# Patient Record
Sex: FEMALE | Race: ASIAN
[De-identification: names, ages, dates, MRNs, and addresses within clinical notes are randomized per-mention and may not be internally consistent; named-entity substitution may affect disease eponyms.]

---

## 2020-10-07 ENCOUNTER — HOSPITAL ENCOUNTER (INPATIENT)
Dept: HOSPITAL 77 - KA.MS | Age: 55
LOS: 1 days | Discharge: HOME | DRG: 813 | End: 2020-10-08
Attending: FAMILY MEDICINE | Admitting: FAMILY MEDICINE
Payer: MEDICAID

## 2020-10-07 DIAGNOSIS — H91.93: ICD-10-CM

## 2020-10-07 DIAGNOSIS — D69.3: Primary | ICD-10-CM

## 2020-10-07 DIAGNOSIS — Z79.899: ICD-10-CM

## 2020-10-07 DIAGNOSIS — M32.9: ICD-10-CM

## 2020-10-07 DIAGNOSIS — I50.20: ICD-10-CM

## 2020-10-07 DIAGNOSIS — Z79.52: ICD-10-CM

## 2020-10-07 DIAGNOSIS — I42.0: ICD-10-CM

## 2020-10-07 DIAGNOSIS — Z20.828: ICD-10-CM

## 2020-10-07 DIAGNOSIS — D70.9: ICD-10-CM

## 2020-10-07 PROCEDURE — U0002 COVID-19 LAB TEST NON-CDC: HCPCS

## 2020-10-07 RX ADMIN — Medication SCH MG: at 21:20

## 2020-10-07 RX ADMIN — Medication SCH TAB: at 21:20

## 2020-10-08 RX ADMIN — Medication SCH MG: at 08:22

## 2020-10-08 RX ADMIN — Medication SCH TAB: at 08:21

## 2020-10-08 NOTE — [id number]
**Discharge Summary





- Hospital Course


Diagnosis: Stroke: No





- Discharge Data


Discharge Date: 10/08/20


Discharge Disposition: Home, Self-Care 01


Condition: Good





- Referral to Home Health


Primary Care Physician: 


Angie Soriano MD








- Patient Instructions


Diet: Usual Diet as Tolerated


Activity: As Tolerated


Driving: [place] Drive Today


Showering/Bathing: May Shower


Notify Provider of: Fever


Other/Special Instructions: Worsening bleeding, bruising on your skin, blood in 

urine or bleeding gums.  Continue with weekly CBC blood work at [facility]





- Discharge Plan


*PRESCRIPTION DRUG MONITORING PROGRAM REVIEWED*: Not Applicable


*COPY OF PRESCRIPTION DRUG MONITORING REPORT IN PATIENT NICHOLAS: Not Applicable


Home Medications: 


                                    Home Meds





Cholecalciferol (Vitamin D3) [Vitamin D3] 2,000 unit PO DAILY 07/08/20 [History]


Furosemide 40 mg PO DAILY 07/08/20 [History]


Gabapentin [Neurontin] 100 mg PO TID 07/08/20 [History]


Hydroxychloroquine [Plaquenil] 200 mg PO DAILY 07/08/20 [History]


Nortriptyline 25 mg PO BEDTIME 07/08/20 [History]


Pilocarpine [Salagen] 5 mg PO BID 07/08/20 [History]


Sacubitril/Valsartan [Entresto 97 mg-103 mg Tablet] 1 tab PO BID 07/08/20 

[History]


carvediloL [Carvedilol] 25 mg PO BIDMEALS 07/08/20 [History]


predniSONE [Prednisone] 7.5 mg PO DAILY 07/08/20 [History]


Eltrombopag Olamine [Promacta] 25 mg PO BID 10/07/20 [History]


Furosemide [Lasix] 20 mg PO 1200 10/07/20 [History]


Pantoprazole Sodium [Protonix] 20 mg PO DAILY 10/07/20 [History]











- Discharge Summary/Plan Comment


DC Time >30 min.: No


Discharge Summary/Plan Comment: 


Final diagnosis


Thrombocytopenia, severe improved





History summary


54-year-old female was admitted yesterday by Dr. DOWNS in conjunction with Dr. Francois oncologist for IVIG therapy and life-threatening symptomatic  

thrombocytopenia of a platelet count of 7000.  Patient has past medical history 

of thrombocytopenia, neutropenia, systemic lupus erythematosus, systolic 

congestive heart failure and dilated cardiomyopathy and neutropenia.  Seen and 

evaluated in an outpatient [facility] yesterday by oncologist and had 

significant subcutaneous bruising throughout her body.





Hospital course


Eventful as she did have IVIG and platelet count improved to 18,000 with less 

bruising.  Vital signs stable, no fever.  She did receive Granix the morning of 

discharge.  





Consultations


Heart/oncologist was consulted of the patient's hospital progress, lab values 

and agrees for discharge with close follow-up of weekly CBC counts.  Patient is 

to monitor for ongoing bruising worsening bruising hematuria hemoptysis bleeding

 gums.





- General Info


Functional Status: Reports: Pain Controlled





- Review of Systems


General: Reports: No Symptoms


HEENT: Reports: No Symptoms


Pulmonary: Reports: No Symptoms


Cardiovascular: Reports: No Symptoms


Gastrointestinal: Reports: No Symptoms


Genitourinary: Reports: No Symptoms


Musculoskeletal: Reports: No Symptoms


Skin: Reports: Bruising (improving bruising)


Neurological: Denies: Confusion


Psychiatric: Reports: No Symptoms





- Patient Data


Vitals - Most Recent: 


                                Last Vital Signs











Temp  97.2 F   10/08/20 06:09


 


Pulse  78   10/08/20 08:21


 


Resp  16   10/08/20 06:09


 


BP  174/88 H  10/08/20 08:21


 


Pulse Ox  97   10/08/20 06:09











Weight - Most Recent: 128 lb 4.8 oz


I&O - Last 24 hours: 


                                 Intake & Output











 10/07/20 10/08/20 10/08/20





 22:59 06:59 14:59


 


Intake Total  350 


 


Balance  350 











Lab Results - Last 24 hrs: 


                         Laboratory Results - last 24 hr











  10/07/20 10/08/20 Range/Units





  17:10 07:15 


 


WBC   2.94 L  (5.00-10.00)  10^3/uL


 


RBC   4.36  (3.80-5.50)  10^6/uL


 


Hgb   13.0  (12.0-16.0)  g/dL


 


Hct   39.1  (37.0-47.0)  %


 


MCV   89.7  (82.0-92.0)  fL


 


MCH   29.8  (27.0-31.0)  pg


 


MCHC   33.2  (32.0-36.0)  g/dL


 


RDW   11.7  (11.5-14.5)  %


 


Plt Count   18 L*  (150-400)  10^3/uL


 


MPV   13.3 H  (7.4-10.4)  fL


 


Immature Gran % (Auto)   0.0  (0.0-5.0)  %


 


Neut % (Auto)   33.0 L  (50.0-70.0)  %


 


Lymph % (Auto)   46.6 H  (20.0-40.0)  %


 


Mono % (Auto)   16.0 H  (2.0-8.0)  %


 


Eos % (Auto)   3.4 H  (1.0-3.0)  %


 


Baso % (Auto)   1.0  (0.0-1.0)  %


 


Neut # (Auto)   0.97 L  (2.50-7.00)  10^3/uL


 


Lymph # (Auto)   1.37  (1.00-4.00)  10^3/uL


 


Mono # (Auto)   0.47  (0.10-0.80)  10^3/uL


 


Eos # (Auto)   0.10  (0.10-0.30)  10^3/uL


 


Baso # (Auto)   0.03  (0.00-0.10)  10^3/uL


 


Immature Gran # (Auto)   0.00  (0.00-0.50)  10^3/uL


 


SARS CoV-2 RNA Rapid J LUIS  Negative   (NEGATIVE)  











Med Orders - Current: 


                               Current Medications





Carvedilol (Coreg)  25 mg PO BIDMEALS [facility]


   Last Admin: 10/08/20 08:21 Dose:  25 mg


   Documented by: 


Cholecalciferol (Vitamin D3)  50 mcg PO DAILY [facility]


   Last Admin: 10/08/20 08:21 Dose:  50 mcg


   Documented by: 


Sodium Chloride (Normal Saline)  500 mls @ 100 mls/hr IV .BOLUS [facility]


   Last Admin: 10/07/20 18:10 Dose:  100 mls/hr


   Documented by: 


Entresto 97 Mg - 103 (Mg Tab **Own Med**)  0 tab PO BID [facility]


   Last Admin: 10/08/20 08:21 Dose:  1 tab


   Documented by: 


Pilocarpine 5 Mg (Tabs **Own Med**)  0 mg PO BID [facility]


   Last Admin: 10/08/20 08:22 Dose:  5 mg


   Documented by: 


Omeprazole (Omeprazole)  20 mg PO ACBREAKFAST [facility]


   Last Admin: 10/08/20 07:19 Dose:  20 mg


   Documented by: 


Prednisone (Prednisone)  7.5 mg PO DAILY [facility]


   Last Admin: 10/08/20 08:20 Dose:  7.5 mg


   Documented by: 





Discontinued Medications





Acetaminophen (Tylenol)  650 mg PO ONETIME ONE


   Stop: 10/07/20 17:16


   Last Admin: 10/07/20 18:00 Dose:  650 mg


   Documented by: 


Diphenhydramine HCl (Benadryl)  25 mg IVPUSH ONETIME ONE


   Stop: 10/07/20 17:16


   Last Admin: 10/07/20 18:10 Dose:  25 mg


   Documented by: 


Immune Globulin (Octagam 10%)  600 mls @ 18 mls/hr IV ONETIME ONE; Protocol


   Stop: 10/09/20 02:49


   Last Admin: 10/07/20 18:32 Dose:  18 mls/hr


   Documented by: 


Non-Formulary Medication (Sacubitril/Valsartan [Entresto 97 Mg-103 Mg Tablet])  

1 tab PO BID [facility]











- Exam


Quality Assessment: Denies: Supplemental Oxygen


General: Reports: Alert, Oriented


Lungs: Reports: Clear to Auscultation, Normal Respiratory Effort


Cardiovascular: Reports: Regular Rhythm


GI/Abdominal Exam: Soft, No Distention


Skin: Reports: Ecchymosis (bruising upper cutaneous arms and legs however 

lighting up in color and improving)


Psy/Mental Status: Reports: Alert, Normal Affect, Normal Mood

## 2022-10-06 ENCOUNTER — HOSPITAL ENCOUNTER (OUTPATIENT)
Dept: HOSPITAL 77 - KA.ED | Age: 57
Setting detail: OBSERVATION
LOS: 2 days | Discharge: HOME | End: 2022-10-08
Attending: STUDENT IN AN ORGANIZED HEALTH CARE EDUCATION/TRAINING PROGRAM | Admitting: NURSE PRACTITIONER
Payer: MEDICARE

## 2022-10-06 DIAGNOSIS — E86.0: Primary | ICD-10-CM

## 2022-10-06 DIAGNOSIS — E09.9: ICD-10-CM

## 2022-10-06 DIAGNOSIS — Z79.84: ICD-10-CM

## 2022-10-06 DIAGNOSIS — I42.9: ICD-10-CM

## 2022-10-06 DIAGNOSIS — D70.8: ICD-10-CM

## 2022-10-06 DIAGNOSIS — I50.9: ICD-10-CM

## 2022-10-06 DIAGNOSIS — B02.29: ICD-10-CM

## 2022-10-06 DIAGNOSIS — Z79.899: ICD-10-CM

## 2022-10-06 DIAGNOSIS — K21.9: ICD-10-CM

## 2022-10-06 DIAGNOSIS — R33.9: ICD-10-CM

## 2022-10-06 DIAGNOSIS — M32.9: ICD-10-CM

## 2022-10-06 DIAGNOSIS — E87.6: ICD-10-CM

## 2022-10-06 DIAGNOSIS — E88.09: ICD-10-CM

## 2022-10-06 DIAGNOSIS — E83.42: ICD-10-CM

## 2022-10-06 PROCEDURE — 83690 ASSAY OF LIPASE: CPT

## 2022-10-06 PROCEDURE — 85025 COMPLETE CBC W/AUTO DIFF WBC: CPT

## 2022-10-06 PROCEDURE — 85379 FIBRIN DEGRADATION QUANT: CPT

## 2022-10-06 PROCEDURE — 80053 COMPREHEN METABOLIC PANEL: CPT

## 2022-10-06 PROCEDURE — 36415 COLL VENOUS BLD VENIPUNCTURE: CPT

## 2022-10-06 PROCEDURE — 87040 BLOOD CULTURE FOR BACTERIA: CPT

## 2022-10-06 PROCEDURE — 87086 URINE CULTURE/COLONY COUNT: CPT

## 2022-10-06 PROCEDURE — U0002 COVID-19 LAB TEST NON-CDC: HCPCS

## 2022-10-06 PROCEDURE — 80202 ASSAY OF VANCOMYCIN: CPT

## 2022-10-06 PROCEDURE — 93005 ELECTROCARDIOGRAM TRACING: CPT

## 2022-10-06 PROCEDURE — 83735 ASSAY OF MAGNESIUM: CPT

## 2022-10-06 PROCEDURE — 71045 X-RAY EXAM CHEST 1 VIEW: CPT

## 2022-10-06 PROCEDURE — 83605 ASSAY OF LACTIC ACID: CPT

## 2022-10-06 PROCEDURE — 86140 C-REACTIVE PROTEIN: CPT

## 2022-10-06 PROCEDURE — 84100 ASSAY OF PHOSPHORUS: CPT

## 2022-10-06 PROCEDURE — 84484 ASSAY OF TROPONIN QUANT: CPT

## 2022-10-28 LAB
ANION GAP SERPL CALC-SCNC: 11.5 MMOL/L (ref 5–15)
ANION GAP SERPL CALC-SCNC: 12.3 MMOL/L (ref 5–15)
CHLORIDE SERPL-SCNC: 106 MMOL/L (ref 98–115)
CHLORIDE SERPL-SCNC: 106 MMOL/L (ref 98–115)
EGFRCR SERPLBLD CKD-EPI 2021: 68 ML/MIN (ref 60–?)
EGFRCR SERPLBLD CKD-EPI 2021: 79 ML/MIN (ref 60–?)
SODIUM SERPL-SCNC: 138 MMOL/L (ref 136–145)
SODIUM SERPL-SCNC: 140 MMOL/L (ref 136–145)

## 2022-11-09 ENCOUNTER — HOSPITAL ENCOUNTER (OUTPATIENT)
Dept: HOSPITAL 77 - KA.ED | Age: 57
Setting detail: OBSERVATION
LOS: 1 days | Discharge: HOME | End: 2022-11-10
Attending: FAMILY MEDICINE | Admitting: FAMILY MEDICINE
Payer: MEDICARE

## 2022-11-09 DIAGNOSIS — R53.1: Primary | ICD-10-CM

## 2022-11-09 DIAGNOSIS — Z79.84: ICD-10-CM

## 2022-11-09 DIAGNOSIS — D70.9: ICD-10-CM

## 2022-11-09 DIAGNOSIS — Z98.890: ICD-10-CM

## 2022-11-09 DIAGNOSIS — G47.30: ICD-10-CM

## 2022-11-09 DIAGNOSIS — Z79.899: ICD-10-CM

## 2022-11-09 DIAGNOSIS — D84.9: ICD-10-CM

## 2022-11-09 DIAGNOSIS — M35.00: ICD-10-CM

## 2022-11-09 DIAGNOSIS — E11.9: ICD-10-CM

## 2022-11-09 DIAGNOSIS — K21.9: ICD-10-CM

## 2022-11-09 DIAGNOSIS — E87.6: ICD-10-CM

## 2022-11-09 DIAGNOSIS — I50.20: ICD-10-CM

## 2022-11-09 DIAGNOSIS — I42.0: ICD-10-CM

## 2022-11-09 LAB
ANION GAP SERPL CALC-SCNC: 12.8 MMOL/L (ref 5–15)
CHLORIDE SERPL-SCNC: 105 MMOL/L (ref 98–107)
EGFRCR SERPLBLD CKD-EPI 2021: 81 ML/MIN (ref 60–?)
SODIUM SERPL-SCNC: 140 MMOL/L (ref 136–145)

## 2022-11-09 RX ADMIN — CARBOXYMETHYLCELLULOSE SODIUM SCH DROP: 5 SOLUTION/ DROPS OPHTHALMIC at 20:22

## 2022-11-09 RX ADMIN — CARVEDILOL SCH EACH: 12.5 TABLET, FILM COATED ORAL at 20:22

## 2022-11-10 VITALS — DIASTOLIC BLOOD PRESSURE: 62 MMHG | HEART RATE: 94 BPM | SYSTOLIC BLOOD PRESSURE: 126 MMHG

## 2022-11-10 LAB
ANION GAP SERPL CALC-SCNC: 11.8 MMOL/L (ref 5–15)
CHLORIDE SERPL-SCNC: 106 MMOL/L (ref 98–107)
EGFRCR SERPLBLD CKD-EPI 2021: 81 ML/MIN (ref 60–?)
SODIUM SERPL-SCNC: 143 MMOL/L (ref 136–145)

## 2022-11-10 RX ADMIN — PANTOPRAZOLE SODIUM SCH: 20 TABLET, DELAYED RELEASE ORAL at 06:29

## 2022-11-10 RX ADMIN — CARVEDILOL SCH EACH: 12.5 TABLET, FILM COATED ORAL at 08:30

## 2022-11-10 RX ADMIN — CARBOXYMETHYLCELLULOSE SODIUM SCH DROP: 5 SOLUTION/ DROPS OPHTHALMIC at 08:30

## 2022-11-10 RX ADMIN — PANTOPRAZOLE SODIUM SCH MG: 20 TABLET, DELAYED RELEASE ORAL at 06:01

## 2023-10-03 ENCOUNTER — HOSPITAL ENCOUNTER (OUTPATIENT)
Dept: HOSPITAL 77 - KA.SDS | Age: 58
Discharge: HOME | End: 2023-10-03
Attending: FAMILY MEDICINE
Payer: MEDICARE

## 2023-10-03 DIAGNOSIS — D69.3: ICD-10-CM

## 2023-10-03 DIAGNOSIS — I42.0: ICD-10-CM

## 2023-10-03 DIAGNOSIS — E67.3: ICD-10-CM

## 2023-10-03 DIAGNOSIS — K31.89: ICD-10-CM

## 2023-10-03 DIAGNOSIS — R33.9: ICD-10-CM

## 2023-10-03 DIAGNOSIS — M35.00: ICD-10-CM

## 2023-10-03 DIAGNOSIS — I50.22: ICD-10-CM

## 2023-10-03 DIAGNOSIS — E66.9: ICD-10-CM

## 2023-10-03 DIAGNOSIS — Z79.899: ICD-10-CM

## 2023-10-03 DIAGNOSIS — H91.93: ICD-10-CM

## 2023-10-03 DIAGNOSIS — K76.0: ICD-10-CM

## 2023-10-03 DIAGNOSIS — K21.00: Primary | ICD-10-CM

## 2023-10-03 DIAGNOSIS — E09.65: ICD-10-CM

## 2023-10-03 DIAGNOSIS — M81.8: ICD-10-CM

## 2023-10-03 DIAGNOSIS — M32.9: ICD-10-CM

## 2023-10-03 DIAGNOSIS — K29.50: ICD-10-CM

## 2023-10-03 DIAGNOSIS — D70.9: ICD-10-CM

## 2024-08-12 ENCOUNTER — HOSPITAL ENCOUNTER (EMERGENCY)
Dept: HOSPITAL 77 - KA.ED | Age: 59
LOS: 1 days | Discharge: NURSING, REHAB OR LONG-TERM CARE | End: 2024-08-13
Payer: MEDICARE

## 2024-08-12 DIAGNOSIS — Z79.84: ICD-10-CM

## 2024-08-12 DIAGNOSIS — Z79.899: ICD-10-CM

## 2024-08-12 DIAGNOSIS — J96.90: ICD-10-CM

## 2024-08-12 DIAGNOSIS — Z90.49: ICD-10-CM

## 2024-08-12 DIAGNOSIS — Z79.52: ICD-10-CM

## 2024-08-12 DIAGNOSIS — I50.22: Primary | ICD-10-CM

## 2024-08-12 DIAGNOSIS — E10.9: ICD-10-CM

## 2024-08-12 LAB
ALBUMIN SERPL-MCNC: 3.51 G/DL (ref 3.4–5)
ALP SERPL-CCNC: 83 U/L (ref 46–116)
ALT SERPL-CCNC: 35 U/L (ref 14–63)
ANION GAP SERPL CALC-SCNC: 11.3 MMOL/L (ref 5–15)
APPEARANCE UR: CLEAR
APTT PPP: 23.8 SEC (ref 23.3–34.9)
AST SERPL-CCNC: 27 U/L (ref 15–37)
BACTERIA URNS QL MICRO: (no result) /HPF
BASOPHILS # BLD AUTO: 0.01 10^3/UL (ref 0–0.1)
BASOPHILS NFR BLD AUTO: 0.1 % (ref 0–1)
BILIRUB SERPL-MCNC: 0.3 MG/DL (ref 0.2–1)
BILIRUB UR STRIP-MCNC: NEGATIVE MG/DL
BNP SERPL-MCNC: 566 PG/ML (ref 0–100)
BUN SERPL-MCNC: 10 MG/DL (ref 7–18)
CALCIUM SERPL-MCNC: 9 MG/DL (ref 8.7–10.3)
CHLORIDE SERPL-SCNC: 105 MMOL/L (ref 98–107)
CO2 SERPL-SCNC: 29 MMOL/L (ref 21–32)
COLOR UR: YELLOW
CREAT CL 24H UR+SERPL-VRATE: (no result) ML/MIN
CREAT SERPL-MCNC: 0.86 MG/DL (ref 0.51–1.17)
EGFRCR SERPLBLD CKD-EPI 2021: 77 ML/MIN (ref 60–?)
EOSINOPHIL # BLD AUTO: 0.04 10^3/UL (ref 0.1–0.3)
EOSINOPHIL NFR BLD AUTO: 0.5 % (ref 1–3)
EPI CELLS #/AREA URNS HPF: (no result) /LPF
ERYTHROCYTE [DISTWIDTH] IN BLOOD BY AUTOMATED COUNT: 12.8 % (ref 11.5–14.5)
FLUAV RNA UPPER RESP QL NAA+PROBE: NEGATIVE
FLUBV RNA UPPER RESP QL NAA+PROBE: NEGATIVE
GLUCOSE SERPL-MCNC: 168 MG/DL (ref 70–140)
GLUCOSE UR STRIP-MCNC: NEGATIVE MG/DL
HCT VFR BLD AUTO: 40.4 % (ref 37–47)
HGB BLD-MCNC: 12.8 G/DL (ref 12–16)
IMM GRANULOCYTES # BLD: 0.01 10^3/UL (ref 0–0.5)
IMM GRANULOCYTES NFR BLD: 0.1 % (ref 0–5)
INR PPP: 1 (ref 0.9–1.1)
KETONES UR STRIP-MCNC: NEGATIVE MG/DL
LYMPHOCYTES # BLD AUTO: 1.82 10^3/UL (ref 1–4)
LYMPHOCYTES NFR BLD AUTO: 23 % (ref 20–40)
MCH RBC QN AUTO: 29 PG (ref 27–31)
MCHC RBC AUTO-ENTMCNC: 31.7 G/DL (ref 32–36)
MCHC RBC AUTO-ENTMCNC: 91.4 FL (ref 82–92)
MONOCYTES # BLD AUTO: 0.63 10^3/UL (ref 0.1–0.8)
MONOCYTES NFR BLD AUTO: 8 % (ref 2–8)
MUCOUS THREADS URNS QL MICRO: (no result) /LPF
NEUTROPHILS # BLD AUTO: 5.41 10^3/UL (ref 2.5–7)
NEUTROPHILS NFR BLD AUTO: 68.3 % (ref 50–70)
NITRITE UR QL: NEGATIVE
PH UR STRIP: 6 [PH] (ref 5–9)
PLATELET # BLD AUTO: 277 10^3/UL (ref 150–400)
PMV BLD AUTO: 11 FL (ref 7.4–10.4)
POTASSIUM SERPL-SCNC: 4.3 MMOL/L (ref 3.5–5.1)
PROT SERPL-MCNC: 8.3 G/DL (ref 6.4–8.2)
PROT UR STRIP-MCNC: (no result) MG/DL
PROTHROMBIN TIME: 10.4 SEC (ref 9.3–12.2)
RBC # BLD AUTO: 4.42 10^6/UL (ref 3.8–5.5)
RBC # URNS HPF: (no result) /HPF (ref 0–5)
RBC UR QL: NEGATIVE
RSV RNA UPPER RESP QL NAA+PROBE: NEGATIVE
SARS-COV-2 RNA RESP QL NAA+PROBE: NEGATIVE
SODIUM SERPL-SCNC: 141 MMOL/L (ref 136–145)
SP GR UR STRIP: <= 1.005 (ref 1–1.03)
UROBILINOGEN UR STRIP-ACNC: 0.2 E.U./DL (ref 0.2–1)
WBC # BLD AUTO: 7.92 10^3/UL (ref 5–10)
WBC UR QL: (no result) /HPF (ref 0–5)

## 2024-08-12 RX ADMIN — PROPOFOL ONE: 10 INJECTION, EMULSION INTRAVENOUS at 23:33

## 2024-08-12 RX ADMIN — Medication PRN ML: at 21:42

## 2024-08-12 RX ADMIN — PROPOFOL ONE MG: 10 INJECTION, EMULSION INTRAVENOUS at 23:31

## 2024-08-12 RX ADMIN — MIDAZOLAM HYDROCHLORIDE ONE MG: 1 INJECTION, SOLUTION INTRAMUSCULAR; INTRAVENOUS at 22:22

## 2024-08-12 RX ADMIN — ALBUTEROL SULFATE ONE: 2.5 SOLUTION RESPIRATORY (INHALATION) at 21:50

## 2024-08-12 RX ADMIN — ALBUTEROL SULFATE ONE MG: 2.5 SOLUTION RESPIRATORY (INHALATION) at 21:50

## 2024-08-12 RX ADMIN — SUCCINYLCHOLINE CHLORIDE ONE MG: 20 INJECTION, SOLUTION INTRAMUSCULAR; INTRAVENOUS at 22:11

## 2024-08-12 RX ADMIN — PROPOFOL ONE MG: 10 INJECTION, EMULSION INTRAVENOUS at 22:10

## 2024-08-12 RX ADMIN — PHENYLEPHRINE HYDROCHLORIDE PRN MG: 10 INJECTION INTRAVENOUS at 22:27

## 2024-08-12 RX ADMIN — MIDAZOLAM HYDROCHLORIDE ONE MG: 1 INJECTION, SOLUTION INTRAMUSCULAR; INTRAVENOUS at 22:42
